# Patient Record
Sex: MALE | Race: WHITE
[De-identification: names, ages, dates, MRNs, and addresses within clinical notes are randomized per-mention and may not be internally consistent; named-entity substitution may affect disease eponyms.]

---

## 2019-06-12 ENCOUNTER — HOSPITAL ENCOUNTER (EMERGENCY)
Dept: HOSPITAL 61 - ER | Age: 49
Discharge: HOME | End: 2019-06-12
Payer: SELF-PAY

## 2019-06-12 VITALS — WEIGHT: 315 LBS | BODY MASS INDEX: 42.66 KG/M2 | HEIGHT: 72 IN

## 2019-06-12 VITALS — DIASTOLIC BLOOD PRESSURE: 86 MMHG | SYSTOLIC BLOOD PRESSURE: 149 MMHG

## 2019-06-12 DIAGNOSIS — I10: ICD-10-CM

## 2019-06-12 DIAGNOSIS — E66.01: ICD-10-CM

## 2019-06-12 DIAGNOSIS — M54.12: Primary | ICD-10-CM

## 2019-06-12 DIAGNOSIS — M25.511: ICD-10-CM

## 2019-06-12 PROCEDURE — 99283 EMERGENCY DEPT VISIT LOW MDM: CPT

## 2019-06-12 NOTE — PHYS DOC
Past Medical History


Past Medical History:  Hypertension





Adult General


Chief Complaint


Chief Complaint:  SHOULDER PAIN





HPI


HPI





Patient is a 48  year old right-handed male who presents with complaining of 

pain in right shoulder. Patient complaining of pain to starting from the right 

side of his neck with radiation to shoulder and oriented to be done today elbow 

with numbness of right thumb for the last 3 weeks as a constant sharp pain that 

getting worse for the last 3 days. Patient stated the pain getting worse with 

movement and denies injury or overusing his arm. Patient did not seek medical 

attention for his pain and states he took 800 mg ibuprofen without improvement 

of his pain. Patient states he was seen by a clinic last week and had elevation 

of blood pressure and was advised to lose weight for improvement of his blood 

pressure.





Review of Systems


Review of Systems





Constitutional: Denies fever or chills []


Eyes: Denies change in visual acuity, redness, or eye pain []


HENT: Denies nasal congestion or sore throat []


Respiratory: Denies cough or shortness of breath []


Cardiovascular: No additional information not addressed in HPI []


GI: Denies abdominal pain, nausea, vomiting, bloody stools or diarrhea []


: Denies dysuria or hematuria []


Musculoskeletal: Denies back pain, reports joint pain []


Integument: Denies rash or skin lesions []


Neurologic: Denies headache, focal weakness or sensory changes []


Endocrine: Denies polyuria or polydipsia []





All other systems were reviewed and found to be within normal limits, except as 

documented in this note.





Current Medications


Current Medications





Current Medications








 Medications


  (Trade)  Dose


 Ordered  Sig/Sheyla  Start Time


 Stop Time Status Last Admin


Dose Admin


 


 Clonidine HCl


  (Catapres)  0.1 mg  1X  ONCE  6/12/19 07:15


 6/12/19 07:16 DC 6/12/19 07:08


0.1 MG











Allergies


Allergies





Allergies








Coded Allergies Type Severity Reaction Last Updated Verified


 


  No Known Drug Allergies    6/12/19 No











Physical Exam


Physical Exam





Constitutional: Well developed, well nourished, mild distress, non-toxic 

appearance, morbidly obese. []


HENT: Normocephalic, atraumatic.


Eyes: PERRLA, EOMI, conjunctiva normal, no discharge. [] 


Neck: Normal range of motion, no tenderness, supple, no stridor. [] 


Cardiovascular:Heart rate regular rhythm, no murmur []


Lungs & Thorax:  Bilateral breath sounds clear to auscultation []


Skin: Warm, dry, no erythema, no rash. [] 


Back: No tenderness, no CVA tenderness. [] 


Extremities: Right shoulder without deformity, normal range of motion, no 

tenderness, subjective decrease of sensation in right thumb.


Neurologic: Alert and oriented X 3, normal motor function,  no focal deficits 

noted. []


Psychologic: Affect normal, judgement normal, mood normal. []





Current Patient Data


Vital Signs





                                   Vital Signs








  Date Time  Temp Pulse Resp B/P (MAP) Pulse Ox O2 Delivery O2 Flow Rate FiO2


 


6/12/19 07:42  86 16 166/101 (122) 96 Room Air  


 


6/12/19 06:46 97.8       





 97.8       











EKG


EKG


[]





Radiology/Procedures


Radiology/Procedures


[]





Course & Med Decision Making


Course & Med Decision Making


Evaluation of patient in ER showed 48-year-old right-handed male with 

complaining of cervical radiculopathy pain of right upper extremity for 3 weeks.

 Patient had blood pressure of 185/115 at arrival to ER with diagnose of 

hypertension one week ago without starting medication. Patient treated with 

clonidine with improvement of blood pressure to 153/86  . Patient was advised to

 follow-up with the primary care physician for possible MRI of cervical spine.





Dragon Disclaimer


Dragon Disclaimer


This electronic medical record was generated, in whole or in part, using a voice

 recognition dictation system.





Departure


Departure


Impression:  


   Primary Impression:  


   Cervical radiculopathy


   Additional Impressions:  


   Uncontrolled hypertension


   Morbid obesity with BMI of 40.0-44.9, adult


Disposition:  01 HOME, SELF-CARE (at 0740)


Condition:  STABLE


Patient Instructions:  Cervical Radiculopathy, Form - Blood Pressure Record 

Sheet, How to Take Your Blood Pressure, Easy-to-Read, Managing Your High Blood 

Pressure





Additional Instructions:  


Continue home ibuprofen


Follow-up with your primary care physician in 3-5 days for elevation of blood 

pressure and possible referral for MRI of neck


Return to ER if not getting better


Scripts


Tramadol Hcl (ULTRAM) 50 Mg Tablet


50 MG PO Q6HRS PRN for PAIN, #14 TAB 0 Refills


   Prov: CARL VANG MD         6/12/19 


Prednisone (PREDNISONE) 50 Mg Tablet


1 TAB PO DAILY, #5 TAB


   Prov: CARL VANG MD         6/12/19 


Cyclobenzaprine Hcl (CYCLOBENZAPRINE HCL) 10 Mg Tablet


1 TAB PO TID, #21 TAB


   Prov: CARL VANG MD         6/12/19 


Lisinopril (LISINOPRIL) 10 Mg Tablet


1 TAB PO DAILY, #30 TAB 0 Refills


   Prov: CRAL VANG MD         6/12/19





Problem Qualifiers











CARL VANG MD             Jun 12, 2019 07:08